# Patient Record
Sex: MALE | Race: OTHER | HISPANIC OR LATINO | URBAN - METROPOLITAN AREA
[De-identification: names, ages, dates, MRNs, and addresses within clinical notes are randomized per-mention and may not be internally consistent; named-entity substitution may affect disease eponyms.]

---

## 2022-10-02 ENCOUNTER — EMERGENCY (EMERGENCY)
Facility: HOSPITAL | Age: 32
LOS: 1 days | Discharge: HOME | End: 2022-10-02
Attending: EMERGENCY MEDICINE | Admitting: EMERGENCY MEDICINE
Payer: SELF-PAY

## 2022-10-02 VITALS
OXYGEN SATURATION: 99 % | DIASTOLIC BLOOD PRESSURE: 98 MMHG | HEIGHT: 70 IN | HEART RATE: 86 BPM | RESPIRATION RATE: 16 BRPM | SYSTOLIC BLOOD PRESSURE: 153 MMHG | TEMPERATURE: 98 F | WEIGHT: 179.9 LBS

## 2022-10-02 PROCEDURE — 99284 EMERGENCY DEPT VISIT MOD MDM: CPT

## 2022-10-02 NOTE — ED PROVIDER NOTE - NSFOLLOWUPINSTRUCTIONS_ED_ALL_ED_FT
1) Follow-up with your Primary Medical Doctor or referred doctor. Call today / next business day for prompt follow-up.  2) Return to Emergency room for any worsening or persistent pain, weakness, fever, or any other concerning symptoms.  3) See attached instruction sheets for additional information, including information regarding signs and symptoms to look out for, reasons to seek immediate care and other important instructions.  4) Follow-up with any specialists as discussed / noted as well. 1) Follow-up with opthalmology at [address] [place] [address] [address] [phone]  2) Return to Emergency room for any worsening or persistent pain, weakness, fever, or any other concerning symptoms.  3) See attached instruction sheets for additional information, including information regarding signs and symptoms to look out for, reasons to seek immediate care and other important instructions.  4) Follow-up with any specialists as discussed / noted as well.  5) Artifical tears every 1 hour.  6) erythromycin ointment 4 times a day        LO QUE NECESITA SABER:    ¿Qué es kiran quemadura por sustancias químicas en el gurvinder?Kiran quemadura por sustancias químicas en el gurvinder es kiran lesión a cualquier parte del gurvinder que esté expuesto a sustancias químicas. Kiran quemadura por sustancias químicas en el gurvinder es kiran lesión grave y puede resultar en kiran pérdida permanente de la visión.  Anatomía del gurvinder         ¿Qué sustancias químicas pueden causar kiran quemadura en el gurvinder?  •Las sustancias químicas en los trabajos de construcción, andrea el cemento y el yeso      •Los agentes de limpieza, andrea limpiadores de desagües o limpiadores con amoníaco      •El ácido sulfúrico, andrea el fluido de la batería de un coche      •Productos para quitar oxidación      •Ácido clorhídrico      •Fuegos artificiales      •Pesticida y fertilizante      ¿Cuáles son los signos y síntomas de kiran quemadura por sustancias químicas en el gurvinder?  •Dolor, sensibilidad a la alexi      •Visión borrosa, pérdida de la visión      •Párpado crenshaw e inflamado      •Gaines, ampollas u otro daño en lowry gurvinder      •Sensación que tiene algo dentro del gurvinder      •Tejido ocular nebuloso      ¿Cómo se diagnostica kiran quemadura por sustancias químicas en el gurvinder?Lowry médico le preguntará acerca de abelino síntomas y cuándo comenzaron. Le preguntará qué sustancias químicas estaba usando al sufrir la quemadura. El médico le enjuagará el gurvinder y examinará lowry visión y abelino movimientos oculares. Es posible que usted necesite alguno de los siguientes:   •Kiran prueba de papel de tornasol,o prueba de papel del pH, puede mostrar si la sustancia química se ha eliminado de lowry gurvinder. Se coloca un pequeño trozo de papel en el párpado inferior para atrapar las lágrimas.      •Kiran prueba con lámpara de hendidurapuede mostrar kiran lesión en el gurvinder o cualquier sustancia química que quede en el gurvinder. Lowry médico examina lowry gurvinder a través de un microscopio con kiran alexi brillante.             •Kiran prueba de tinción del ojopuede mostrar daños en el gurvinder o cualquier líquido que drene del gurvinder. Lowry médico le pondrá un tinte e iluminará el gurvinder con kiran alexi roxanne.      •La tonometríaes kiran prueba que mide la presión del gurvinder.      ¿Qué dinah hacer si me entra kiran sustancia química en el gurvinder?  •Enjuague lowry gurvinder inmediatamente.Deje correr kiran corriente sondra de agua yuko al menos 15 minutos. Utilice el agua más limpia que usted pueda obtener rápidamente. Nunca utilice otras sustancias químicas para enjuagar lowry gurvinder. Mueva lowry glóbulo ocular en todas direcciones para asegurar que todas las partes se enjuaguen. Si es posible, continúe a enjuagar lowry gurvinder con agua hasta que llegue al centro de tratamiento.      •Quítese la ropaque todavía podrían contener sustancias químicas. No se quite abelino lentes de contacto.      •Si es posible, traiga el envase de la sustancia química de forma segurapara mostrárselo a lowry médico. No lleve el envase si la sustancia química puede quemarlo nuevamente.      ¿Cómo se trata kiran quemadura por sustancias químicas en el gurvinder?  •Cuidado del gurvinder, incluyendo un enjuague continuo, puede ser necesario. Es posible que lowry médico necesite extraer cualquier objeto extraño del gurvinder. Puede darle un parche para proteger el gurvinder mientras se benito.      •Los medicamentospodrían administrarse para prevenir kiran infección, disminuir el dolor y la inflamación o relajar los músculos oculares.      •La cirugíapuede ser necesaria si el daño en el gurvinder es grave.      ¿Cómo puede prevenir kiran quemadura por sustancias químicas en el gurvinder?  •Use siempre protección ocular,andrea gafas de seguridad, que se ajustan isael alrededor de los ojos.      •No se toque los ojosmientras trabaja con sustancias químicas.      •Siga las instruccionesdel envase al usar sustancias químicas que puedan quemar los ojos.      •Eladio un plan en bladimir que usted o alguien se queme.Asesórese dónde se encuentra la mejor agua o líquido para enjuagar abelino ojos. Verifique si lowry compañía tiene kiran estación para el lavado de los ojos.      ¿Cuándo dinah buscar atención inmediata?  •Lorwy visión está nebulosa o pierde lowry visión.      •Usted tiene gaines, ampollas u otros daños en el globo ocular.      •Lowry gurvinder se enrojece o se pone nebuloso.      ¿Cuándo dinah llamar a mi médico?  •Lowry gurvinder continúa goteando o se siente reseco.      •Lowry pupila está más gil de lo usual.      •Usted tiene preguntas o inquietudes acerca de lowry condición o cuidado.      ACUERDOS SOBRE LOWRY CUIDADO:    Usted tiene el derecho de ayudar a planear lowry cuidado. Aprenda todo lo que pueda sobre lowry condición y andrea darle tratamiento. Discuta abelino opciones de tratamiento con abelino médicos para decidir el cuidado que usted desea recibir. Usted siempre tiene el derecho de rechazar el tratamiento.       © Copyright [employer] 2022

## 2022-10-02 NOTE — ED ADULT TRIAGE NOTE - WEIGHT IN LBS
EXAM DATE/TIME:  02/09/2018 19:40 

 

[facility] COMPARISON:     

No previous studies available for comparison.

 

                     

INDICATIONS :     

Fever.

                     

 

MEDICAL HISTORY :     

None.          

 

SURGICAL HISTORY :     

None.   

 

ENCOUNTER:     

Initial                                        

 

ACUITY:     

3 days      

 

PAIN SCORE:     

0/10

 

LOCATION:     

Bilateral chest 

 

FINDINGS:     

There is patchy infiltrates involving the right lung base. The left lung is grossly clear. The heart 
size is within normal limits. No definite pleural effusions. There is elevation of the right hemidiap
hragm. There is an old healed right-sided rib fractures.

 

CONCLUSION:     

Patchy diffuse infiltrate involving the right mid to right lower lung. This is suggestive of pneumoni
a.

 

 

 

 Evert Duval MD on February 09, 2018 at 20:04           

Board Certified Radiologist.

 This report was verified electronically.
EXAM DATE/TIME:  02/10/2018 13:35 

 

[facility] COMPARISON:     

CHEST PA & LAT, February 09, 2018, 19:40.

 

                     

INDICATIONS :     

Cough for 3 days

                     

 

MEDICAL HISTORY :     

None.          

 

SURGICAL HISTORY :     

None.   

 

ENCOUNTER:     

Initial                                        

 

ACUITY:     

3 days      

 

PAIN SCORE:     

0/10

 

LOCATION:     

Bilateral chest 

 

FINDINGS:     

Mild atherosclerotic changes are noted of the aorta with normal size heart. Left lung remains essenti
ally clear. Right patchy diffuse airspace disease infiltrate process in the mid and basilar lung fiel
d slightly accentuated due to decreased inspiration.     

 

 

CONCLUSION:     

Persistent right mid and basilar patchy alveolar airspace disease not significantly changed allowing 
for differences in inspiratory effort

 

 

 

 Ken Aguilar MD on February 10, 2018 at 14:01           

Board Certified Radiologist.

 This report was verified electronically.
EXAM DATE/TIME:  02/13/2018 04:17 

 

[facility] COMPARISON:     

CHEST SINGLE AP, February 10, 2018, 13:35.

 

                     

INDICATIONS :     

Short of breath.

                     

 

MEDICAL HISTORY :     

None.          

 

SURGICAL HISTORY :     

None.   

 

ENCOUNTER:     

Subsequent                                        

 

ACUITY:     

1 week      

 

PAIN SCORE:     

0/10

 

LOCATION:     

Bilateral  chest

 

FINDINGS:     

There is increasing interstitial infiltrate bilaterally with cardiomegaly, and a small right effusion
 suspected. Right lower lobe airspace disease is noted. 

 

CONCLUSION:     

Worsening appearance of the chest.

 

 

 

 Serafin Tom MD on February 13, 2018 at 5:01           

Board Certified Radiologist.

 This report was verified electronically.
179.8

## 2022-10-02 NOTE — ED PROVIDER NOTE - PHYSICAL EXAMINATION
Gen: Alert, NAD  Head/eyes: NC/AT, PERRL, left eye conjunctiva +injected, +mild chemosis, no ulceartion, no corneal abrasion woods lamp exam, visual acuity b/l eye 20/20  ENT: airway patent  Neck: supple  Pulm/lung: Bilateral clear BS  CV/heart: RRR  GI/Abd: soft, NT/ND, +BS, no guarding/rebound tenderness  Musculoskeletal: no edema/erythema/cyanosis  Skin: no rash  Neuro: AAOx3, grossly intact

## 2022-10-02 NOTE — ED ADULT NURSE NOTE - CHPI ED NUR SYMPTOMS POS
Gen: Lying in bed, AAOx3, mild distress due to abdominal pain, no rigors  Abdomen: Normal habitus, BS+, TTP LUQ without guarding, varicosities seen superficially. BLURRED VISION/FOREIGN BODY

## 2022-10-02 NOTE — ED ADULT NURSE NOTE - CHPI ED NUR SYMPTOMS NEG
no blurred vision/no discharge/no double vision/no drainage/no itching/no photophobia/no purulent drainage

## 2022-10-02 NOTE — ED PROVIDER NOTE - PROGRESS NOTE DETAILS
discussed case with Dr. Richter, optho resident, recommend f/u in office tomorrow [address] [address] [facility] [address], [phone], recommend d/c on erythromycin, and artifical tears.  Pt feeling better, pH 7.0 on litmus test

## 2022-10-02 NOTE — ED ADULT NURSE NOTE - OBJECTIVE STATEMENT
Was at work and had a chemical splash in left eye called super 8> concentrated Commercial grade for cleaning per the manager who came in with him. Stated it is the most dangerous chemical on site. Pt went to clean the floor, placed the container down , attempted to open it from a fair distance, but the chemical "shot upwards" making contact with the affected eye. He rinsed for minutes, and then came to ED. Pt states he can see from the eye a little, but unable to open spontaneously at present

## 2022-10-02 NOTE — ED PROVIDER NOTE - NS ED ROS FT
Constitutional: - Fever, - Chills, - Anorexia, - Fatigue, - Night sweats  Eyes: - Discharge, + Irritation, +Redness, - Visual changes, - Light sensitivity, +Pain  EARS: - Ear Pain, - Tinnitus, - Decreased hearing  NOSE: - Congestion, - Epistaxis  MOUTH/THROAT: - Vocal Changes, - Drooling, - Sore throat  NECK: - Lumps, - Stiffness, - Pain  CV: - Palpitations, - Chest Pain, - Edema, - Syncope  RESP:  - Cough, - Shortness of Breath, - Dyspnea on Exertion, - Trouble speaking, - Pleuritic pain - Wheezing  GI: - Diarrhea, - Constipation, - Bloody stools, - Nausea, - Vomiting, - Abdominal Pain  : - Dysuria, -Frequency, - Hematuria, - Hesitancy, - Incontinence, - Saddle Anesthesia, - Abnormal discharge  MSK: - Myalgias, - Arthralgias, - Weakness, - Deformities, - Injuries  SKIN: - Color change, - Rash, - Swelling, - Ecchymosis, - Abrasion, - laceration  NEURO: - Change in behavior, - Dec. Alertness, - Headache, - Dizziness, - Change in speech, - Weakness, - Seizure-like activity, - Difficulty ambulating

## 2022-10-02 NOTE — ED PROVIDER NOTE - PATIENT PORTAL LINK FT
You can access the FollowMyHealth Patient Portal offered by [facility] by registering at the following website: [email or web address]. By joining [employer]’s FollowMyHealth portal, you will also be able to view your health information using other applications (apps) compatible with our system.

## 2022-10-02 NOTE — ED PROVIDER NOTE - OBJECTIVE STATEMENT
31-year-old male no past medical history status post getting cleaning solution containing bleach into his left eye while at work.  Feeling burning sensation and irritation to left eye washed it at work for 15 minutes now here for evaluation.  Denies any loss of vision/Visual changes.

## 2022-10-03 VITALS
TEMPERATURE: 98 F | HEART RATE: 80 BPM | OXYGEN SATURATION: 99 % | DIASTOLIC BLOOD PRESSURE: 90 MMHG | RESPIRATION RATE: 16 BRPM | SYSTOLIC BLOOD PRESSURE: 147 MMHG

## 2022-10-03 PROCEDURE — 99282 EMERGENCY DEPT VISIT SF MDM: CPT

## 2022-10-03 RX ORDER — ERYTHROMYCIN BASE 5 MG/GRAM
1 OINTMENT (GRAM) OPHTHALMIC (EYE) ONCE
Refills: 0 | Status: DISCONTINUED | OUTPATIENT
Start: 2022-10-03 | End: 2022-10-06

## 2022-10-03 NOTE — ED ADULT NURSE REASSESSMENT NOTE - NS ED NURSE REASSESS COMMENT FT1
First liter RL completed. Second liter recommended and flushing left eye. Pt tolerated procedure
Pt was assisted with 20 minutes at eye wash station earlier, and at 23:30 100 ml of saline flushing. Eye acuity performed post, and he resulted 20/20 in left eye at 200 feet and 20 feet Dr. Pressley applied magna lens to left eye with RL 1000 solution in progress. Pt tolerated procedure
Erythromycin ointment and artificial tears applied to left eye. Pt was discharged with meds and instructions

## 2022-10-04 ENCOUNTER — NON-APPOINTMENT (OUTPATIENT)
Age: 32
End: 2022-10-04

## 2022-10-04 ENCOUNTER — APPOINTMENT (OUTPATIENT)
Dept: OPHTHALMOLOGY | Facility: CLINIC | Age: 32
End: 2022-10-04

## 2022-10-04 PROCEDURE — 92004 COMPRE OPH EXAM NEW PT 1/>: CPT

## 2024-01-15 NOTE — ED PROVIDER NOTE - PRINCIPAL DIAGNOSIS
Virtual Regular Visit    Verification of patient location:    Patient is located at Home in the following state in which I hold an active license [place]    Assessment/Plan:    Problem List Items Addressed This Visit          Digestive    Viral gastroenteritis - Primary     49 y/o male with: viral gastroenteritis. Discussed supportive care and return parameters. Encouraged ample PO liquids with electrolytes.       Reason for visit is   Chief Complaint   Patient presents with    Nausea     Patient is complaining of vomiting, headache, and body ache since yesterday. He did test negative for covid last night. No further concerns,     Virtual Regular Visit          Encounter provider Erick Bustamante MD    Provider located at [facility]  [address]  [facility] [address] [phone]      Recent Visits  Date Type Provider Dept   01/11/24 Telemedicine Erick Bustamante MD [facility]   Showing recent visits within past 7 days and meeting all other requirements  Future Appointments  No visits were found meeting these conditions.  Showing future appointments within next 150 days and meeting all other requirements       The patient was identified by name and date of birth. Kareem Leiva was informed that this is a telemedicine visit and that the visit is being conducted through the Epic Embedded platform. He agrees to proceed..  My office door was closed. No one else was in the room.  He acknowledged consent and understanding of privacy and security of the video platform. The patient has agreed to participate and understands they can discontinue the visit at any time.    Patient is aware this is a billable service.     Subjective  Kareem Leiva is a 48 y.o. male.      Pt is a 49 y/o male who presents via video visit complaining of several days of nausea vomiting no fevers or chills     Nausea  Associated symptoms include  nausea and vomiting.        Past Medical History:   Diagnosis Date    Diabetes mellitus (HCC)     Hyperlipemia     Hypertension        Past Surgical History:   Procedure Laterality Date    CATARACT EXTRACTION      IR PICC PLACEMENT SINGLE LUMEN  2/1/2022    TOE AMPUTATION Left 1/26/2022    Procedure: PARTIAL LEFT HALLUX AMPUTATION;  Surgeon: Bert Kerr DPM;  Location:  MAIN OR;  Service: Podiatry       Current Outpatient Medications   Medication Sig Dispense Refill    amLODIPine (NORVASC) 10 mg tablet TAKE 1 TABLET BY MOUTH EVERYDAY AT BEDTIME 90 tablet 1    atenolol (TENORMIN) 100 mg tablet TAKE 1 TABLET BY MOUTH EVERY DAY 90 tablet 1    carisoprodol (SOMA) 350 mg tablet Take 1 tablet (350 mg total) by mouth 3 (three) times a day 90 tablet 1    cloNIDine (CATAPRES) 0.1 mg tablet TAKE 1 TABLET BY MOUTH TWICE A  tablet 1    insulin degludec (Tresiba FlexTouch) 200 units/mL CONCENTRATED U-200 injection pen Inject 60 Units under the skin daily at bedtime 15 mL 2    Insulin Pen Needle (PEN NEEDLES) 31G X 6 MM MISC Inject under the skin 3 (three) times a day 300 each 1    losartan-hydrochlorothiazide (HYZAAR) 100-25 MG per tablet Take 1 tablet by mouth daily 90 tablet 1    metFORMIN (GLUCOPHAGE-XR) 500 mg 24 hr tablet TAKE 1 TABLET BY MOUTH TWICE A DAY WITH MEALS 180 tablet 1    methocarbamol (ROBAXIN) 750 mg tablet Take 1 tablet (750 mg total) by mouth every 6 (six) hours as needed for muscle spasms 90 tablet 1    pregabalin (LYRICA) 200 MG capsule Take 1 capsule (200 mg total) by mouth 3 (three) times a day 90 capsule 1    semaglutide, 1 mg/dose, (Ozempic, 1 MG/DOSE,) 4 mg/3 mL injection pen INJECT 0.75 ML (1 MG TOTAL) UNDER THE SKIN ONCE A WEEK 9 mL 1    spironolactone (ALDACTONE) 25 mg tablet TAKE 1 TABLET BY MOUTH DAILY AT BEDTIME 90 tablet 1    capsicum (ZOSTRIX) 0.075 % topical cream Apply topically Three times daily as needed      collagenase (SANTYL) ointment Apply topically daily (Patient not  taking: Reported on 3/30/2022) 15 g 0    DULoxetine (CYMBALTA) 30 mg delayed release capsule TAKE 1 TAB WITH A 60MG TAB TO EQUAL 90MG DAILY. (Patient not taking: Reported on 10/18/2023) 90 capsule 1    DULoxetine (CYMBALTA) 60 mg delayed release capsule TAKE 1 TABLET BY MOUTH WITH A 30MG TAB TO EQUAL 90MG DAILY. (Patient not taking: Reported on 10/18/2023) 90 capsule 1    lidocaine (Lidoderm) 5 % Apply 1 patch topically over 12 hours daily Remove & Discard patch within 12 hours or as directed by MD (Patient not taking: Reported on 11/7/2023) 30 patch 2    Lidocaine 4 % PTCH Place 1 patch on the skin daily (Patient not taking: Reported on 11/7/2023)      meclizine (ANTIVERT) 25 mg tablet Take 25 mg by mouth Three times daily as needed (Patient not taking: Reported on 1/10/2023)      ondansetron (Zofran ODT) 4 mg disintegrating tablet Take 1 tablet (4 mg total) by mouth every 8 (eight) hours as needed for nausea or vomiting (Patient not taking: Reported on 1/10/2023) 30 tablet 1    predniSONE 10 mg tablet Take 4 tabs daily x 4d then 3x4d then 2x4d then 1x4d then 0.5x4d then stop. (Patient not taking: Reported on 10/18/2023) 45 tablet 0    predniSONE 20 mg tablet TAKE 3 TABS X 2 DAYS THEN 2 TABS X 2 DAYS THEN 1 TAB X 2 DAYS (Patient not taking: Reported on 10/18/2023)      traMADol (ULTRAM) 50 mg tablet  (Patient not taking: Reported on 1/11/2024)       No current facility-administered medications for this visit.        Allergies   Allergen Reactions    Penicillins Other (See Comments)     Tolerated Cefazolin previously in 2018 without a problem.       Review of Systems   Constitutional: Negative.    HENT: Negative.     Eyes: Negative.    Respiratory: Negative.     Cardiovascular: Negative.    Gastrointestinal:  Positive for nausea and vomiting.   Endocrine: Negative.    Genitourinary: Negative.    Musculoskeletal: Negative.    Allergic/Immunologic: Negative.    Neurological: Negative.    Hematological: Negative.   "  Psychiatric/Behavioral: Negative.     All other systems reviewed and are negative.      Video Exam    Vitals:    01/11/24 0925   Temp: 100 °F (37.8 °C)   TempSrc: Temporal   Weight: (!) 151 kg (332 lb)   Height: 5' 9\" (1.753 m)       Physical Exam  Constitutional:       Appearance: He is well-developed.   HENT:      Head: Atraumatic.      Right Ear: External ear normal.      Left Ear: External ear normal.   Eyes:      Conjunctiva/sclera: Conjunctivae normal.      Pupils: Pupils are equal, round, and reactive to light.   Pulmonary:      Effort: Pulmonary effort is normal. No respiratory distress.   Abdominal:      General: There is no distension.   Musculoskeletal:         General: Normal range of motion.      Cervical back: Normal range of motion.   Skin:     General: Skin is warm and dry.   Neurological:      Mental Status: He is alert and oriented to person, place, and time.      Cranial Nerves: No cranial nerve deficit.   Psychiatric:         Behavior: Behavior normal.         Thought Content: Thought content normal.         Judgment: Judgment normal.              " Chemical conjunctivitis, left